# Patient Record
Sex: FEMALE | Race: WHITE | Employment: OTHER | URBAN - METROPOLITAN AREA
[De-identification: names, ages, dates, MRNs, and addresses within clinical notes are randomized per-mention and may not be internally consistent; named-entity substitution may affect disease eponyms.]

---

## 2020-10-27 ENCOUNTER — APPOINTMENT (OUTPATIENT)
Dept: GENERAL RADIOLOGY | Facility: HOSPITAL | Age: 85
End: 2020-10-27
Attending: EMERGENCY MEDICINE
Payer: MEDICARE

## 2020-10-27 ENCOUNTER — APPOINTMENT (OUTPATIENT)
Dept: ULTRASOUND IMAGING | Facility: HOSPITAL | Age: 85
End: 2020-10-27
Attending: EMERGENCY MEDICINE
Payer: MEDICARE

## 2020-10-27 ENCOUNTER — HOSPITAL ENCOUNTER (EMERGENCY)
Facility: HOSPITAL | Age: 85
Discharge: HOME | End: 2020-10-27
Attending: EMERGENCY MEDICINE
Payer: MEDICARE

## 2020-10-27 VITALS
BODY MASS INDEX: 32.2 KG/M2 | HEIGHT: 62 IN | RESPIRATION RATE: 18 BRPM | HEART RATE: 70 BPM | OXYGEN SATURATION: 99 % | DIASTOLIC BLOOD PRESSURE: 72 MMHG | TEMPERATURE: 98 F | WEIGHT: 175 LBS | SYSTOLIC BLOOD PRESSURE: 138 MMHG

## 2020-10-27 DIAGNOSIS — R60.9 DEPENDENT EDEMA: Primary | ICD-10-CM

## 2020-10-27 PROCEDURE — 80048 BASIC METABOLIC PNL TOTAL CA: CPT | Performed by: EMERGENCY MEDICINE

## 2020-10-27 PROCEDURE — 83880 ASSAY OF NATRIURETIC PEPTIDE: CPT | Performed by: EMERGENCY MEDICINE

## 2020-10-27 PROCEDURE — 99285 EMERGENCY DEPT VISIT HI MDM: CPT

## 2020-10-27 PROCEDURE — 84484 ASSAY OF TROPONIN QUANT: CPT | Performed by: EMERGENCY MEDICINE

## 2020-10-27 PROCEDURE — 93005 ELECTROCARDIOGRAM TRACING: CPT

## 2020-10-27 PROCEDURE — 71045 X-RAY EXAM CHEST 1 VIEW: CPT | Performed by: EMERGENCY MEDICINE

## 2020-10-27 PROCEDURE — 93970 EXTREMITY STUDY: CPT | Performed by: EMERGENCY MEDICINE

## 2020-10-27 PROCEDURE — 93010 ELECTROCARDIOGRAM REPORT: CPT | Performed by: EMERGENCY MEDICINE

## 2020-10-27 PROCEDURE — 71046 X-RAY EXAM CHEST 2 VIEWS: CPT | Performed by: EMERGENCY MEDICINE

## 2020-10-27 PROCEDURE — 96374 THER/PROPH/DIAG INJ IV PUSH: CPT

## 2020-10-27 PROCEDURE — 85025 COMPLETE CBC W/AUTO DIFF WBC: CPT | Performed by: EMERGENCY MEDICINE

## 2020-10-27 RX ORDER — FUROSEMIDE 10 MG/ML
40 INJECTION INTRAMUSCULAR; INTRAVENOUS ONCE
Status: COMPLETED | OUTPATIENT
Start: 2020-10-27 | End: 2020-10-27

## 2020-10-27 NOTE — ED NOTES
[facility] here with [facility] to transport patient to [facility].

## 2020-10-27 NOTE — ED NOTES
Pt to ED via EMS with c/o increasing bilateral lower extremity swelling. Pt denies chest pain or sob. No respiratory distress noted. 99% on room air. Pt is alert and oriented x4. Pt currently on 20 mg of Lasix daily. Pt denies recent fall or injury.  +2 blaine

## 2020-10-27 NOTE — ED PROVIDER NOTES
Patient Seen in: [facility] AND [facility] Emergency Department    History   Patient presents with:  Swelling Edema    Stated Complaint: edema    HPI    Patient is here without any symptoms.   She states she feels completely fine she denies any pain in her chest < 120         Review of Systems  Constitutional: no fever, normal appetite, normal energy, has otherwise been feeling well  HEENT: No cough, no congestion  Cardiovascular: no chest pain  Respiratory: no shortness of breath  Gastrointestinal: no abdominal p She is currently taking 40 p.o. at the nursing facility. I recommended compression stockings to the patient she is not currently using any.   She states that she has had this for a long time and she has the same legs as her mother her mother had this issue Impression:  Dependent edema  (primary encounter diagnosis)    Disposition:  Discharge    Follow-up:        See your doctor for recheck in the next 3 to 4 days      Medications Prescribed:  Current Discharge Medication List

## 2021-10-14 ENCOUNTER — HOSPITAL ENCOUNTER (INPATIENT)
Facility: HOSPITAL | Age: 86
LOS: 3 days | Discharge: NURSING, REHAB OR LONG-TERM CARE | DRG: 069 | End: 2021-10-17
Admitting: HOSPITALIST
Payer: MEDICARE

## 2021-10-14 ENCOUNTER — APPOINTMENT (OUTPATIENT)
Dept: CV DIAGNOSTICS | Facility: HOSPITAL | Age: 86
DRG: 069 | End: 2021-10-14
Attending: HOSPITALIST
Payer: MEDICARE

## 2021-10-14 ENCOUNTER — APPOINTMENT (OUTPATIENT)
Dept: CT IMAGING | Facility: HOSPITAL | Age: 86
DRG: 069 | End: 2021-10-14
Payer: MEDICARE

## 2021-10-14 ENCOUNTER — APPOINTMENT (OUTPATIENT)
Dept: ULTRASOUND IMAGING | Facility: HOSPITAL | Age: 86
DRG: 069 | End: 2021-10-14
Attending: HOSPITALIST
Payer: MEDICARE

## 2021-10-14 ENCOUNTER — APPOINTMENT (OUTPATIENT)
Dept: GENERAL RADIOLOGY | Facility: HOSPITAL | Age: 86
DRG: 069 | End: 2021-10-14
Attending: EMERGENCY MEDICINE
Payer: MEDICARE

## 2021-10-14 DIAGNOSIS — R55 SYNCOPE AND COLLAPSE: Primary | ICD-10-CM

## 2021-10-14 PROCEDURE — 70450 CT HEAD/BRAIN W/O DYE: CPT

## 2021-10-14 PROCEDURE — 93970 EXTREMITY STUDY: CPT | Performed by: HOSPITALIST

## 2021-10-14 PROCEDURE — 99223 1ST HOSP IP/OBS HIGH 75: CPT | Performed by: OTHER

## 2021-10-14 PROCEDURE — 93306 TTE W/DOPPLER COMPLETE: CPT | Performed by: HOSPITALIST

## 2021-10-14 PROCEDURE — 71045 X-RAY EXAM CHEST 1 VIEW: CPT | Performed by: EMERGENCY MEDICINE

## 2021-10-14 PROCEDURE — 93880 EXTRACRANIAL BILAT STUDY: CPT | Performed by: HOSPITALIST

## 2021-10-14 RX ORDER — LABETALOL HYDROCHLORIDE 5 MG/ML
10 INJECTION, SOLUTION INTRAVENOUS EVERY 10 MIN PRN
Status: DISCONTINUED | OUTPATIENT
Start: 2021-10-14 | End: 2021-10-16

## 2021-10-14 RX ORDER — HEPARIN SODIUM 1000 [USP'U]/ML
80 INJECTION, SOLUTION INTRAVENOUS; SUBCUTANEOUS ONCE
Status: COMPLETED | OUTPATIENT
Start: 2021-10-14 | End: 2021-10-14

## 2021-10-14 RX ORDER — ATORVASTATIN CALCIUM 40 MG/1
40 TABLET, FILM COATED ORAL NIGHTLY
Status: DISCONTINUED | OUTPATIENT
Start: 2021-10-14 | End: 2021-10-17

## 2021-10-14 RX ORDER — HEPARIN SODIUM AND DEXTROSE 10000; 5 [USP'U]/100ML; G/100ML
INJECTION INTRAVENOUS CONTINUOUS
Status: DISCONTINUED | OUTPATIENT
Start: 2021-10-15 | End: 2021-10-15

## 2021-10-14 RX ORDER — TORSEMIDE 100 MG/1
50 TABLET ORAL DAILY
COMMUNITY

## 2021-10-14 RX ORDER — CLOPIDOGREL BISULFATE 75 MG/1
75 TABLET ORAL DAILY
Status: DISCONTINUED | OUTPATIENT
Start: 2021-10-14 | End: 2021-10-15

## 2021-10-14 RX ORDER — ACETAMINOPHEN 325 MG/1
650 TABLET ORAL 2 TIMES DAILY
COMMUNITY

## 2021-10-14 RX ORDER — HYDRALAZINE HYDROCHLORIDE 20 MG/ML
10 INJECTION INTRAMUSCULAR; INTRAVENOUS EVERY 2 HOUR PRN
Status: DISCONTINUED | OUTPATIENT
Start: 2021-10-14 | End: 2021-10-16

## 2021-10-14 RX ORDER — HEPARIN SODIUM AND DEXTROSE 10000; 5 [USP'U]/100ML; G/100ML
18 INJECTION INTRAVENOUS ONCE
Status: COMPLETED | OUTPATIENT
Start: 2021-10-14 | End: 2021-10-14

## 2021-10-14 RX ORDER — ASPIRIN 325 MG
325 TABLET ORAL DAILY
Status: DISCONTINUED | OUTPATIENT
Start: 2021-10-14 | End: 2021-10-14

## 2021-10-14 RX ORDER — METOCLOPRAMIDE HYDROCHLORIDE 5 MG/ML
5 INJECTION INTRAMUSCULAR; INTRAVENOUS EVERY 8 HOURS PRN
Status: DISCONTINUED | OUTPATIENT
Start: 2021-10-14 | End: 2021-10-14

## 2021-10-14 RX ORDER — ASPIRIN 300 MG
300 SUPPOSITORY, RECTAL RECTAL DAILY
Status: DISCONTINUED | OUTPATIENT
Start: 2021-10-14 | End: 2021-10-14

## 2021-10-14 RX ORDER — ACETAMINOPHEN 650 MG/1
650 SUPPOSITORY RECTAL EVERY 4 HOURS PRN
Status: DISCONTINUED | OUTPATIENT
Start: 2021-10-14 | End: 2021-10-17

## 2021-10-14 RX ORDER — ACETAMINOPHEN 325 MG/1
650 TABLET ORAL EVERY 4 HOURS PRN
Status: DISCONTINUED | OUTPATIENT
Start: 2021-10-14 | End: 2021-10-17

## 2021-10-14 RX ORDER — METOCLOPRAMIDE HYDROCHLORIDE 5 MG/ML
5 INJECTION INTRAMUSCULAR; INTRAVENOUS EVERY 8 HOURS PRN
Status: DISCONTINUED | OUTPATIENT
Start: 2021-10-14 | End: 2021-10-17

## 2021-10-14 RX ORDER — ONDANSETRON 2 MG/ML
4 INJECTION INTRAMUSCULAR; INTRAVENOUS EVERY 6 HOURS PRN
Status: DISCONTINUED | OUTPATIENT
Start: 2021-10-14 | End: 2021-10-14

## 2021-10-14 RX ORDER — HEPARIN SODIUM 5000 [USP'U]/ML
5000 INJECTION, SOLUTION INTRAVENOUS; SUBCUTANEOUS EVERY 8 HOURS SCHEDULED
Status: DISCONTINUED | OUTPATIENT
Start: 2021-10-14 | End: 2021-10-14

## 2021-10-14 RX ORDER — ONDANSETRON 2 MG/ML
4 INJECTION INTRAMUSCULAR; INTRAVENOUS EVERY 6 HOURS PRN
Status: DISCONTINUED | OUTPATIENT
Start: 2021-10-14 | End: 2021-10-17

## 2021-10-14 NOTE — H&P
[facility] Hospitalist H&P     CC: Patient presents with:  Syncope  Altered Mental Status     PCP: Kandice Myers MD    Date of Admission: 10/14/2021  8:47 AM    ASSESSMENT / PLAN:     Ms. Divya Coburn is a 81 yo F with PMH of HTN who presented after a syncopal sided weakness at facility that resolved on arrival to ER. In the ED, CT brain with R occipital lobe infarct, age indeterminate. Also noted to have hypoxia, 85% on RA. Denies shortness of breath or chest pain. No history of CHF, COPD, asthma.  Niece Mar family history. Review of Systems  Comprehensive ROS reviewed and negative except for what's stated above.      OBJECTIVE:  /54 (BP Location: Right arm)   Pulse 55   Temp 97.4 °F (36.3 °C) (Oral)   Resp 15   Wt 175 lb 0.7 oz (79.4 kg)   SpO2 99% cardiomegaly and chronic central pulmonary venous congestion that could indicate mild CHF/fluid overload. 2. Otherwise no acute pulmonary process. 3. Moderate hiatal hernia is unchanged.     Dictated by (CST): Delbert Dejesus MD on 10/14/2021 at 10:03 AM

## 2021-10-14 NOTE — CONSULTS
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

## 2021-10-14 NOTE — ED QUICK NOTES
Orders for admission, patient is aware of plan and ready to go upstairs. Any questions, please call ED RN Sara Isaac  at extension [phone].     Type of COVID test sent: rapid    COVID Suspicion level: Low         Titratable drug(s) infusing: n/a    Rate:

## 2021-10-14 NOTE — ED PROVIDER NOTES
Patient Seen in: [facility] 3w/sw      History   Patient presents with:  Syncope  Altered Mental Status    Stated Complaint: STROKE ALERT    Subjective:   HPI    24-year-old female presents after syncopal episode.   Patient reports this morning whi Rhythm: Normal rate and regular rhythm. Heart sounds: Normal heart sounds. Pulmonary:      Effort: Pulmonary effort is normal. No respiratory distress. Breath sounds: Normal breath sounds.    Abdominal:      General: Bowel sounds are normal. The (*)     All other components within normal limits   LIPID PANEL - Abnormal; Notable for the following components:    HDL Cholesterol 37 (*)     All other components within normal limits   POCT GLUCOSE - Abnormal; Notable for the following components:    PO 10/14/2021  CONCLUSION:  1. Stable cardiomegaly and chronic central pulmonary venous congestion that could indicate mild CHF/fluid overload. 2. Otherwise no acute pulmonary process. 3. Moderate hiatal hernia is unchanged.     Dictated by (CST): April Rodriges

## 2021-10-14 NOTE — ED INITIAL ASSESSMENT (HPI)
PATIENT ARRIVED VIA EMS FROM [facility] AS STROKE ALERT. LAST KNOWN WELL 0805 TODAY. PATIENT REPORTEDLY HAD WITNESSED SYNCOPAL EPISODE AT [facility] 115 BY LEFT SIDED WEAKNESS PER EMS. PATIENT A&OX3 UPON ARRIVAL.

## 2021-10-15 ENCOUNTER — APPOINTMENT (OUTPATIENT)
Dept: MRI IMAGING | Facility: HOSPITAL | Age: 86
DRG: 069 | End: 2021-10-15
Attending: Other
Payer: MEDICARE

## 2021-10-15 ENCOUNTER — APPOINTMENT (OUTPATIENT)
Dept: MRI IMAGING | Facility: HOSPITAL | Age: 86
DRG: 069 | End: 2021-10-15
Attending: HOSPITALIST
Payer: MEDICARE

## 2021-10-15 DIAGNOSIS — G45.9 TIA (TRANSIENT ISCHEMIC ATTACK): Primary | ICD-10-CM

## 2021-10-15 PROCEDURE — 70551 MRI BRAIN STEM W/O DYE: CPT | Performed by: HOSPITALIST

## 2021-10-15 PROCEDURE — 99233 SBSQ HOSP IP/OBS HIGH 50: CPT | Performed by: OTHER

## 2021-10-15 PROCEDURE — 70544 MR ANGIOGRAPHY HEAD W/O DYE: CPT | Performed by: OTHER

## 2021-10-15 RX ORDER — PANTOPRAZOLE SODIUM 20 MG/1
20 TABLET, DELAYED RELEASE ORAL DAILY
Status: DISCONTINUED | OUTPATIENT
Start: 2021-10-15 | End: 2021-10-17

## 2021-10-15 RX ORDER — LEVOTHYROXINE SODIUM 0.07 MG/1
75 TABLET ORAL
Status: DISCONTINUED | OUTPATIENT
Start: 2021-10-15 | End: 2021-10-17

## 2021-10-15 RX ORDER — FUROSEMIDE 10 MG/ML
20 INJECTION INTRAMUSCULAR; INTRAVENOUS
Status: DISCONTINUED | OUTPATIENT
Start: 2021-10-15 | End: 2021-10-16

## 2021-10-15 RX ORDER — DONEPEZIL HYDROCHLORIDE 10 MG/1
10 TABLET, FILM COATED ORAL NIGHTLY
Status: DISCONTINUED | OUTPATIENT
Start: 2021-10-15 | End: 2021-10-17

## 2021-10-15 NOTE — PHYSICAL THERAPY NOTE
PT orders for eval and treat received. Per medical chart pt with new DVT, started on heparin drip and PTT >240. Will defer PT eval for today and check on pt tomorrow as able/appropriate.     Brandie Ruano, [facility]

## 2021-10-15 NOTE — OCCUPATIONAL THERAPY NOTE
OT orders received and chart reviewed. Per chart pt with new DVT. Heparin initiated;elevated PTT.  OT eval deferred

## 2021-10-15 NOTE — PLAN OF CARE
Pt alert and oriented x3. Disoriented to place. Q2h neuro and vitals check performed until 0400. Pt started on heparin drip due to nonocclusive DVT on L femoral vein. Ceftriaxone administered for UTI.    Plan for MRI of the brain    Problem: Patient Becca saturation or ABGs  - Provide Smoking Cessation handout, if applicable  - Encourage broncho-pulmonary hygiene including cough, deep breathe, Incentive Spirometry  - Assess the need for suctioning and perform as needed  - Assess and instruct to report SOB o Progressing  10/15/2021 0225 by Lele Edwards RN  Outcome: Progressing     - Provide timely, complete, and accurate information to patient/family  - Incorporate patient and family knowledge, values, beliefs, and cultural backgrounds into the plann

## 2021-10-15 NOTE — PROGRESS NOTES
[facility] Hospitalist Progress Note     CC: Hospital Follow up    PCP: Susy Sams MD       Assessment/Plan:     Principal Problem:    Syncope and collapse  Active Problems:    TIA (transient ischemic attack)    Focal infarction of brain admission     FN:  - IVF: none  - Diet: regular     DVT Prophy: SCD, eliquis  Lines: PIV     Dispo: Plan for discharge back to rehab on 10/16 if clinically stable    Code status: DNR    Questions/concerns were discussed with patient and/or family by bedsid TPROT      Imaging:  CT BRAIN OR HEAD ([id number])    Result Date: 10/14/2021  CONCLUSION:  Moderate chronic microvascular ischemic disease. Right occipital lobe infarct is age indeterminate but has a remote appearance.   If there is high clinical suspicion for could indicate mild CHF/fluid overload. 2. Otherwise no acute pulmonary process. 3. Moderate hiatal hernia is unchanged.     Dictated by (CST): Kvng Hutchins MD on 10/14/2021 at 10:03 AM     Finalized by (CST): Kvng Hutchins MD on 10/14/2021 at 10

## 2021-10-15 NOTE — PROGRESS NOTES
[facility]  [address] [facility], [address] [place] [address]  [phone]          INPATIENT NEUROLOGY   FOLLOW UP [facility]     Report of Consultation    Arianne Morales Patient Status:  Inpatient IMAGING:  MRI/A  I PERSONALLY REVIEWED THESE IMAGES AND AGREE WITH THE IMPRESSION.      ASSESSMENT:  The patient is a 80year old woman with past medical history of hypertension, hearing impairment, cognitive impairment who presented after syncopal team: since she will continue to be on anticoagulation for DVT, can use oral anticoagulation for that indicated time period, and resume Plavix afterwards.   No significant intra- or extracranial atherosclerosis to indicate need for both antiplatelet and ant

## 2021-10-15 NOTE — CM/SW NOTE
Patient is from [facility]. Patient will need to be able to pivot transfer in order to return. PT/OT evaluations and recommendations  are needed.      Spoke with patient Eric Like [phone]  If patient  Qualifies for sub acute cy

## 2021-10-15 NOTE — PLAN OF CARE
Pt confused to time. On heparin drip for nonocclusive DVT. Incontinent X2, on room air, [facility]. Plan for MRI today. Pt updated on plan of care.    Problem: Patient Centered Care  Goal: Patient preferences are identified and integrated in the patie limitations  - Instruct pt to call for assistance with activity based on assessment  - Modify environment to reduce risk of injury  - Provide assistive devices as appropriate  - Consider OT/PT consult to assist with strengthening/mobility  - Encourage toil oxygenation  Description: INTERVENTIONS:  - Assess for changes in respiratory status  - Assess for changes in mentation and behavior  - Position to facilitate oxygenation and minimize respiratory effort  - Oxygen supplementation based on oxygen saturation

## 2021-10-16 ENCOUNTER — APPOINTMENT (OUTPATIENT)
Dept: GENERAL RADIOLOGY | Facility: HOSPITAL | Age: 86
DRG: 069 | End: 2021-10-16
Attending: HOSPITALIST
Payer: MEDICARE

## 2021-10-16 PROCEDURE — 73030 X-RAY EXAM OF SHOULDER: CPT | Performed by: HOSPITALIST

## 2021-10-16 RX ORDER — BISACODYL 10 MG
10 SUPPOSITORY, RECTAL RECTAL
Status: DISCONTINUED | OUTPATIENT
Start: 2021-10-16 | End: 2021-10-17

## 2021-10-16 RX ORDER — POLYETHYLENE GLYCOL 3350 17 G/17G
17 POWDER, FOR SOLUTION ORAL DAILY PRN
Status: DISCONTINUED | OUTPATIENT
Start: 2021-10-16 | End: 2021-10-17

## 2021-10-16 RX ORDER — HYDRALAZINE HYDROCHLORIDE 50 MG/1
50 TABLET, FILM COATED ORAL 2 TIMES DAILY
Status: DISCONTINUED | OUTPATIENT
Start: 2021-10-16 | End: 2021-10-16

## 2021-10-16 RX ORDER — TORSEMIDE 20 MG/1
50 TABLET ORAL DAILY
Status: DISCONTINUED | OUTPATIENT
Start: 2021-10-16 | End: 2021-10-16

## 2021-10-16 RX ORDER — METOPROLOL SUCCINATE 50 MG/1
50 TABLET, EXTENDED RELEASE ORAL DAILY
Status: DISCONTINUED | OUTPATIENT
Start: 2021-10-16 | End: 2021-10-17

## 2021-10-16 RX ORDER — TORSEMIDE 20 MG/1
50 TABLET ORAL DAILY
Status: DISCONTINUED | OUTPATIENT
Start: 2021-10-17 | End: 2021-10-17

## 2021-10-16 RX ORDER — HYDRALAZINE HYDROCHLORIDE 50 MG/1
50 TABLET, FILM COATED ORAL 3 TIMES DAILY
Status: DISCONTINUED | OUTPATIENT
Start: 2021-10-16 | End: 2021-10-17

## 2021-10-16 RX ORDER — ATORVASTATIN CALCIUM 40 MG/1
40 TABLET, FILM COATED ORAL NIGHTLY
Qty: 30 TABLET | Refills: 0 | Status: SHIPPED | OUTPATIENT
Start: 2021-10-16 | End: 2022-01-11

## 2021-10-16 NOTE — CM/SW NOTE
EDISON followed up on DC planning. Per chart review, Pt from [facility] and would need ELTON. [address] spoke with jovan who is agreeable for pt to DC to [facility] if therapy recs ELTON.     EDISON sent therapy notes in aidin pending confirmation OB has a bed for the

## 2021-10-16 NOTE — PROGRESS NOTES
[facility] Hospitalist Progress Note     CC: Hospital Follow up    PCP: Jseus Triana MD       Assessment/Plan:     Principal Problem:    Syncope and collapse  Active Problems:    TIA (transient ischemic attack)    Focal infarction of brain Eliquis      HTN  - resume as able  - on metop, hydral, torsemide, resumed today     GOC  - CODE- DNR/comfort- confirmed with Ladell Primus- nephew, updated on admission     FN:  - IVF: none  - Diet: regular     DVT Prophy: SCD, eliquis  Lines: 7. 7   .0 193.0 183.0         Recent Labs   Lab 10/14/21  0850 10/15/21  0538   * 91   BUN 35* 35*   CREATSERUM 1.53* 1.33*   GFRAA 34* 40*   GFRNAA 30* 35*   CA 8.8 8.8    141   K 4.0 3.9    105   CO2 25.0 29.0       No results fo Michelle Friedman MD on 10/15/2021 at 9:37 AM          [address] - DIAG IMG (CPT=93880)    Result Date: 10/14/2021  CONCLUSION:  1. 0-49% stenosis bilateral ICA. 2. 3.8 cm solid nodule in the right lobe of the thyroid.     Dictated by (CST): Lamont

## 2021-10-16 NOTE — PLAN OF CARE
Pt oriented x3. Disoriented to place. Denies chest pain/SOB . Q4h neuro and vital checks performed. No acute changes noted. Pt still receiving IV antibiotics for UTI. Plan for [facility] vs. [facility] pending PT/OT evaluation.      Problem: Patient Centered Achieves optimal ventilation and oxygenation  Description: INTERVENTIONS:  - Assess for changes in respiratory status  - Assess for changes in mentation and behavior  - Position to facilitate oxygenation and minimize respiratory effort  - Oxygen supplement ordered  - Provide education handouts and proof of immunizations to parent/legal guardian  - Facilitate outpatient follow-up appointments  - Consult Case Management and Social Work for medical and insurance needs  Outcome: Progressing

## 2021-10-16 NOTE — PHYSICAL THERAPY NOTE
PHYSICAL THERAPY EVALUATION - INPATIENT     Room Number: 345/345-A  Evaluation Date: 10/16/2021  Type of Evaluation: Initial   Physician Order: PT Eval and Treat    Presenting Problem: TIA, syncope, resp failure, LLE DVT  Reason for Therapy: Mobility Dysf pain.    Problem List  Principal Problem:    Syncope and collapse  Active Problems:    TIA (transient ischemic attack)    Focal infarction of brain [facility])    Toxic encephalopathy      Past Medical History  Past Medical History:   Diagnosis Date   • Disorder Turning over in bed (including adjusting bedclothes, sheets and blankets)?: A Lot   -   Sitting down on and standing up from a chair with arms (e.g., wheelchair, bedside commode, etc.): A Lot   -   Moving from lying on back to sitting on the side of the be patient in preparation for discharge.    Goal #5   Current Status    Goal #6    Goal #6  Current Status

## 2021-10-17 VITALS
WEIGHT: 173 LBS | OXYGEN SATURATION: 94 % | SYSTOLIC BLOOD PRESSURE: 143 MMHG | RESPIRATION RATE: 18 BRPM | HEART RATE: 74 BPM | TEMPERATURE: 99 F | BODY MASS INDEX: 34 KG/M2 | DIASTOLIC BLOOD PRESSURE: 55 MMHG

## 2021-10-17 RX ORDER — HYDRALAZINE HYDROCHLORIDE 50 MG/1
50 TABLET, FILM COATED ORAL 3 TIMES DAILY
Qty: 90 TABLET | Refills: 0 | Status: SHIPPED | COMMUNITY
Start: 2021-10-17

## 2021-10-17 NOTE — OCCUPATIONAL THERAPY NOTE
OCCUPATIONAL THERAPY EVALUATION - INPATIENT     Room Number: 345/345-A  Evaluation Date: 10/17/2021  Type of Evaluation: Initial  Presenting Problem:  (syncope and collapse)    Physician Order: IP Consult to Occupational Therapy  Reason for Therapy: ADL/IA HOB elevates and tray set up. Slight pressure relief provided with pillow under RT side. Call light in reach with pt demo appropriate use.      Pt educated on role of OT, goal of session as well as recommendations to increase active movement of TIGRE UE/LEs i in a w/c. (no one present to confirm accuracy)    Prior Level of [place]: Per pt report, she manages BADLs and mobility within her aparment mod I.     SUBJECTIVE  \"I don't want to move, I just want to be comfortable right now. .i know they'll make me session/findings; Alarm set (HOB elevated)    OT Goals  Patients self stated goal is: to be comfortable     Patient will complete bed mobility with mod A for participation in pressure relief and supine ADLs 1x  Comment:      Comment:     Patient will tolera

## 2021-10-17 NOTE — CM/SW NOTE
1059:  EDISON received message from RN/Jen inquiring if bed is available for patient to admit to [facility].      SW placed call to [facility], spoke to yvette/Bryan, who said clinical information will be reviewed and he will call back ASAP if bed is available to

## 2021-10-17 NOTE — PLAN OF CARE
Patient being discharged to [facility]. Report given to [facility], all questions answered.

## 2021-10-17 NOTE — DISCHARGE SUMMARY
General Medicine Discharge Summary     Patient ID:  Murray Beard  80year old  5/19/1930    Admit date: 10/14/2021    Discharge date and time: 10/17/2021    Attending Physician: Yesi Ventura MD     Consults: IP CONSULT TO HOSPITALIST  IP CONSULT TO SOCIAL transition to Plavix after 3 months of therapy, patient functionally weaker, recommended for ELTON, per PT.   Plans for [facility] to [facility], follow-up with PCP in 1 to 2 weeks, follow-up with neurology in 1 month.     Acute hypoxic respiratory failur COMPLETE (MIN 2 VIEWS), RIGHT (CPT=73030)    Result Date: 10/16/2021  CONCLUSION:  1. No acute appearing fracture or dislocation.   Moderate degenerative narrowing of the glenohumeral joint with mild spurring of the inferior articular surface of the humeral Changes:     Med list     Medication List      START taking these medications    apixaban 5 MG Tabs  Commonly known as: ELIQUIS  Take 1 tablet (5 mg total) by mouth 2 (two) times daily.      atorvastatin 40 MG Tabs  Commonly known as: LIPITOR  Take 1 tablet in 1 week. Specialty: Geriatrics  Contact information:  [address] [place] [address] [phone]                         DC instructions: Other Discharge Instructions:        You have been started on Eliquis for treatment of a blood Medical treatment may be available if action is taken early enough. I reconciled current and discharge medications on day of discharge, discussed changes with patient and noted changes above.        Total Time Coordinating Care: Greater than 30 m

## 2021-10-17 NOTE — PLAN OF CARE
Continues to have intermittent right shoulder pain and edema. Continue q4h neuro checks. Purewick in place. IV rocephin for UTI. Plan for discharge to subacute rehab.      Problem: Patient Centered Care  Goal: Patient preferences are identified and integrat screening  - Complete education with parent/legal guardian  - Teach family how to prepare feedings  - Teach family how to administer medications  - Obtain prescriptions and verify correct dosage of home medications  - Build confidence of parent/family by e Initiate measures to prevent increased intracranial pressure  - Maintain blood pressure and fluid volume within ordered parameters to optimize cerebral perfusion and minimize risk of hemorrhage  - Monitor temperature, glucose, and sodium.  Initiate appropri

## 2022-01-01 ENCOUNTER — HOSPITAL ENCOUNTER (INPATIENT)
Facility: HOSPITAL | Age: 87
LOS: 6 days | Discharge: HOSPICE | DRG: 375 | End: 2022-01-01
Attending: EMERGENCY MEDICINE | Admitting: HOSPITALIST
Payer: MEDICARE

## 2022-01-01 ENCOUNTER — HOSPITAL ENCOUNTER (INPATIENT)
Facility: HOSPITAL | Age: 87
LOS: 1 days | DRG: 375 | End: 2022-01-01
Attending: STUDENT IN AN ORGANIZED HEALTH CARE EDUCATION/TRAINING PROGRAM | Admitting: STUDENT IN AN ORGANIZED HEALTH CARE EDUCATION/TRAINING PROGRAM
Payer: OTHER MISCELLANEOUS

## 2022-01-01 ENCOUNTER — APPOINTMENT (OUTPATIENT)
Dept: GENERAL RADIOLOGY | Facility: HOSPITAL | Age: 87
DRG: 375 | End: 2022-01-01
Attending: INTERNAL MEDICINE
Payer: MEDICARE

## 2022-01-01 ENCOUNTER — APPOINTMENT (OUTPATIENT)
Dept: GENERAL RADIOLOGY | Facility: HOSPITAL | Age: 87
DRG: 375 | End: 2022-01-01
Attending: SURGERY
Payer: MEDICARE

## 2022-01-01 ENCOUNTER — APPOINTMENT (OUTPATIENT)
Dept: CT IMAGING | Facility: HOSPITAL | Age: 87
DRG: 375 | End: 2022-01-01
Attending: INTERNAL MEDICINE
Payer: MEDICARE

## 2022-01-01 ENCOUNTER — APPOINTMENT (OUTPATIENT)
Dept: ULTRASOUND IMAGING | Facility: HOSPITAL | Age: 87
DRG: 375 | End: 2022-01-01
Attending: INTERNAL MEDICINE
Payer: MEDICARE

## 2022-01-01 VITALS
DIASTOLIC BLOOD PRESSURE: 30 MMHG | HEART RATE: 134 BPM | TEMPERATURE: 99 F | RESPIRATION RATE: 20 BRPM | OXYGEN SATURATION: 82 % | SYSTOLIC BLOOD PRESSURE: 61 MMHG

## 2022-01-01 VITALS
WEIGHT: 170 LBS | SYSTOLIC BLOOD PRESSURE: 113 MMHG | DIASTOLIC BLOOD PRESSURE: 44 MMHG | RESPIRATION RATE: 18 BRPM | HEIGHT: 64 IN | OXYGEN SATURATION: 94 % | BODY MASS INDEX: 29.02 KG/M2 | TEMPERATURE: 98 F | HEART RATE: 106 BPM

## 2022-01-01 DIAGNOSIS — E87.2 METABOLIC ACIDOSIS: ICD-10-CM

## 2022-01-01 DIAGNOSIS — E83.51 HYPOCALCEMIA: Primary | ICD-10-CM

## 2022-01-01 DIAGNOSIS — E86.0 DEHYDRATION: ICD-10-CM

## 2022-01-01 DIAGNOSIS — E87.6 HYPOKALEMIA: ICD-10-CM

## 2022-01-01 LAB
ADENOVIRUS F 40/41 PCR: NEGATIVE
ALBUMIN SERPL-MCNC: 2 G/DL (ref 3.4–5)
ALBUMIN SERPL-MCNC: 2.4 G/DL (ref 3.4–5)
ALBUMIN/GLOB SERPL: 0.6 {RATIO} (ref 1–2)
ALP LIVER SERPL-CCNC: 102 U/L
ALP LIVER SERPL-CCNC: 118 U/L
ALT SERPL-CCNC: 24 U/L
ALT SERPL-CCNC: 26 U/L
ANION GAP SERPL CALC-SCNC: 5 MMOL/L (ref 0–18)
ANION GAP SERPL CALC-SCNC: 6 MMOL/L (ref 0–18)
ANION GAP SERPL CALC-SCNC: 7 MMOL/L (ref 0–18)
ANION GAP SERPL CALC-SCNC: 8 MMOL/L (ref 0–18)
ANION GAP SERPL CALC-SCNC: 9 MMOL/L (ref 0–18)
AST SERPL-CCNC: 24 U/L (ref 15–37)
AST SERPL-CCNC: 28 U/L (ref 15–37)
ASTROVIRUS PCR: NEGATIVE
BASOPHILS # BLD AUTO: 0.02 X10(3) UL (ref 0–0.2)
BASOPHILS NFR BLD AUTO: 0.2 %
BASOPHILS NFR BLD AUTO: 0.2 %
BILIRUB DIRECT SERPL-MCNC: 0.2 MG/DL (ref 0–0.2)
BILIRUB SERPL-MCNC: 0.4 MG/DL (ref 0.1–2)
BILIRUB SERPL-MCNC: 0.5 MG/DL (ref 0.1–2)
BILIRUB UR QL: NEGATIVE
BUN BLD-MCNC: 50 MG/DL (ref 7–18)
BUN BLD-MCNC: 51 MG/DL (ref 7–18)
BUN BLD-MCNC: 55 MG/DL (ref 7–18)
BUN BLD-MCNC: 55 MG/DL (ref 7–18)
BUN BLD-MCNC: 61 MG/DL (ref 7–18)
BUN BLD-MCNC: 63 MG/DL (ref 7–18)
BUN BLD-MCNC: 66 MG/DL (ref 7–18)
BUN BLD-MCNC: 66 MG/DL (ref 7–18)
BUN BLD-MCNC: 70 MG/DL (ref 7–18)
BUN BLD-MCNC: 71 MG/DL (ref 7–18)
BUN/CREAT SERPL: 31.4 (ref 10–20)
BUN/CREAT SERPL: 34.3 (ref 10–20)
BUN/CREAT SERPL: 35.7 (ref 10–20)
BUN/CREAT SERPL: 38.6 (ref 10–20)
BUN/CREAT SERPL: 38.8 (ref 10–20)
BUN/CREAT SERPL: 38.8 (ref 10–20)
BUN/CREAT SERPL: 39.9 (ref 10–20)
BUN/CREAT SERPL: 39.9 (ref 10–20)
BUN/CREAT SERPL: 40.4 (ref 10–20)
BUN/CREAT SERPL: 41.7 (ref 10–20)
C CAYETANENSIS DNA SPEC QL NAA+PROBE: NEGATIVE
C DIFF TOX B STL QL: NEGATIVE
CALCIUM BLD-MCNC: 7.3 MG/DL (ref 8.5–10.1)
CALCIUM BLD-MCNC: 7.6 MG/DL (ref 8.5–10.1)
CALCIUM BLD-MCNC: 7.6 MG/DL (ref 8.5–10.1)
CALCIUM BLD-MCNC: 7.7 MG/DL (ref 8.5–10.1)
CALCIUM BLD-MCNC: 7.8 MG/DL (ref 8.5–10.1)
CALCIUM BLD-MCNC: 7.9 MG/DL (ref 8.5–10.1)
CALCIUM BLD-MCNC: 7.9 MG/DL (ref 8.5–10.1)
CALCIUM BLD-MCNC: 8 MG/DL (ref 8.5–10.1)
CAMPY SP DNA.DIARRHEA STL QL NAA+PROBE: NEGATIVE
CHLORIDE SERPL-SCNC: 107 MMOL/L (ref 98–112)
CHLORIDE SERPL-SCNC: 108 MMOL/L (ref 98–112)
CHLORIDE SERPL-SCNC: 109 MMOL/L (ref 98–112)
CHLORIDE SERPL-SCNC: 111 MMOL/L (ref 98–112)
CHLORIDE SERPL-SCNC: 123 MMOL/L (ref 98–112)
CHLORIDE UR-SCNC: 11 MMOL/L
CO2 SERPL-SCNC: 14 MMOL/L (ref 21–32)
CO2 SERPL-SCNC: 22 MMOL/L (ref 21–32)
CO2 SERPL-SCNC: 23 MMOL/L (ref 21–32)
CO2 SERPL-SCNC: 24 MMOL/L (ref 21–32)
CO2 SERPL-SCNC: 24 MMOL/L (ref 21–32)
CO2 SERPL-SCNC: 25 MMOL/L (ref 21–32)
COLOR UR: YELLOW
CREAT BLD-MCNC: 1.32 MG/DL
CREAT BLD-MCNC: 1.32 MG/DL
CREAT BLD-MCNC: 1.38 MG/DL
CREAT BLD-MCNC: 1.4 MG/DL
CREAT BLD-MCNC: 1.53 MG/DL
CREAT BLD-MCNC: 1.56 MG/DL
CREAT BLD-MCNC: 1.7 MG/DL
CREAT BLD-MCNC: 1.7 MG/DL
CREAT BLD-MCNC: 2.04 MG/DL
CREAT BLD-MCNC: 2.26 MG/DL
CREAT UR-SCNC: 83 MG/DL
CRYPTOSP DNA SPEC QL NAA+PROBE: NEGATIVE
DEPRECATED RDW RBC AUTO: 45.3 FL (ref 35.1–46.3)
DEPRECATED RDW RBC AUTO: 46.1 FL (ref 35.1–46.3)
DEPRECATED RDW RBC AUTO: 46.5 FL (ref 35.1–46.3)
DEPRECATED RDW RBC AUTO: 46.7 FL (ref 35.1–46.3)
DEPRECATED RDW RBC AUTO: 47.1 FL (ref 35.1–46.3)
DEPRECATED RDW RBC AUTO: 47.9 FL (ref 35.1–46.3)
DEPRECATED RDW RBC AUTO: 48.7 FL (ref 35.1–46.3)
DEPRECATED RDW RBC AUTO: 49.9 FL (ref 35.1–46.3)
EAEC PAA PLAS AGGR+AATA ST NAA+NON-PRB: NEGATIVE
EC STX1+STX2 + H7 FLIC SPEC NAA+PROBE: NEGATIVE
ENTAMOEBA HISTOLYTICA PCR: NEGATIVE
EOSINOPHIL # BLD AUTO: 0 X10(3) UL (ref 0–0.7)
EOSINOPHIL # BLD AUTO: 0 X10(3) UL (ref 0–0.7)
EOSINOPHIL NFR BLD AUTO: 0 %
EOSINOPHIL NFR BLD AUTO: 0 %
EPEC EAE GENE STL QL NAA+NON-PROBE: NEGATIVE
ERYTHROCYTE [DISTWIDTH] IN BLOOD BY AUTOMATED COUNT: 14.2 % (ref 11–15)
ERYTHROCYTE [DISTWIDTH] IN BLOOD BY AUTOMATED COUNT: 14.2 % (ref 11–15)
ERYTHROCYTE [DISTWIDTH] IN BLOOD BY AUTOMATED COUNT: 14.3 % (ref 11–15)
ERYTHROCYTE [DISTWIDTH] IN BLOOD BY AUTOMATED COUNT: 14.3 % (ref 11–15)
ERYTHROCYTE [DISTWIDTH] IN BLOOD BY AUTOMATED COUNT: 14.4 % (ref 11–15)
ERYTHROCYTE [DISTWIDTH] IN BLOOD BY AUTOMATED COUNT: 14.4 % (ref 11–15)
ERYTHROCYTE [DISTWIDTH] IN BLOOD BY AUTOMATED COUNT: 14.6 % (ref 11–15)
ETEC LTA+ST1A+ST1B TOX ST NAA+NON-PROBE: NEGATIVE
GIARDIA LAMBLIA PCR: NEGATIVE
GLOBULIN PLAS-MCNC: 3.1 G/DL (ref 2.8–4.4)
GLUCOSE BLD-MCNC: 100 MG/DL (ref 70–99)
GLUCOSE BLD-MCNC: 101 MG/DL (ref 70–99)
GLUCOSE BLD-MCNC: 120 MG/DL (ref 70–99)
GLUCOSE BLD-MCNC: 70 MG/DL (ref 70–99)
GLUCOSE BLD-MCNC: 85 MG/DL (ref 70–99)
GLUCOSE BLD-MCNC: 86 MG/DL (ref 70–99)
GLUCOSE BLD-MCNC: 86 MG/DL (ref 70–99)
GLUCOSE BLD-MCNC: 95 MG/DL (ref 70–99)
GLUCOSE BLD-MCNC: 95 MG/DL (ref 70–99)
GLUCOSE UR-MCNC: NEGATIVE MG/DL
HCT VFR BLD AUTO: 25 %
HCT VFR BLD AUTO: 26.1 %
HCT VFR BLD AUTO: 26.2 %
HCT VFR BLD AUTO: 27.1 %
HCT VFR BLD AUTO: 28.2 %
HCT VFR BLD AUTO: 28.9 %
HCT VFR BLD AUTO: 29.9 %
HCT VFR BLD AUTO: 33.1 %
HGB BLD-MCNC: 10.2 G/DL
HGB BLD-MCNC: 7.8 G/DL
HGB BLD-MCNC: 8.2 G/DL
HGB BLD-MCNC: 8.5 G/DL
HGB BLD-MCNC: 8.6 G/DL
HGB BLD-MCNC: 8.7 G/DL
HGB BLD-MCNC: 9.1 G/DL
HGB BLD-MCNC: 9.2 G/DL
HYALINE CASTS #/AREA URNS AUTO: PRESENT /LPF
IMM GRANULOCYTES # BLD AUTO: 0.03 X10(3) UL (ref 0–1)
IMM GRANULOCYTES # BLD AUTO: 0.04 X10(3) UL (ref 0–1)
IMM GRANULOCYTES NFR BLD: 0.3 %
IMM GRANULOCYTES NFR BLD: 0.4 %
LYMPHOCYTES # BLD AUTO: 0.55 X10(3) UL (ref 1–4)
LYMPHOCYTES # BLD AUTO: 0.67 X10(3) UL (ref 1–4)
LYMPHOCYTES NFR BLD AUTO: 5.9 %
LYMPHOCYTES NFR BLD AUTO: 6.5 %
MAGNESIUM SERPL-MCNC: 1.9 MG/DL (ref 1.6–2.6)
MAGNESIUM SERPL-MCNC: 2 MG/DL (ref 1.6–2.6)
MCH RBC QN AUTO: 28 PG (ref 26–34)
MCH RBC QN AUTO: 28.1 PG (ref 26–34)
MCH RBC QN AUTO: 28.3 PG (ref 26–34)
MCH RBC QN AUTO: 28.5 PG (ref 26–34)
MCH RBC QN AUTO: 28.8 PG (ref 26–34)
MCHC RBC AUTO-ENTMCNC: 30.4 G/DL (ref 31–37)
MCHC RBC AUTO-ENTMCNC: 30.9 G/DL (ref 31–37)
MCHC RBC AUTO-ENTMCNC: 31.2 G/DL (ref 31–37)
MCHC RBC AUTO-ENTMCNC: 31.4 G/DL (ref 31–37)
MCHC RBC AUTO-ENTMCNC: 31.7 G/DL (ref 31–37)
MCHC RBC AUTO-ENTMCNC: 32.4 G/DL (ref 31–37)
MCV RBC AUTO: 87.9 FL
MCV RBC AUTO: 88.4 FL
MCV RBC AUTO: 89.1 FL
MCV RBC AUTO: 89.7 FL
MCV RBC AUTO: 90.6 FL
MCV RBC AUTO: 92.5 FL
MCV RBC AUTO: 92.5 FL
MCV RBC AUTO: 94.6 FL
MONOCYTES # BLD AUTO: 1.17 X10(3) UL (ref 0.1–1)
MONOCYTES # BLD AUTO: 1.37 X10(3) UL (ref 0.1–1)
MONOCYTES NFR BLD AUTO: 11.4 %
MONOCYTES NFR BLD AUTO: 14.8 %
NEUTROPHILS # BLD AUTO: 7.28 X10 (3) UL (ref 1.5–7.7)
NEUTROPHILS # BLD AUTO: 7.28 X10(3) UL (ref 1.5–7.7)
NEUTROPHILS # BLD AUTO: 8.33 X10 (3) UL (ref 1.5–7.7)
NEUTROPHILS # BLD AUTO: 8.33 X10(3) UL (ref 1.5–7.7)
NEUTROPHILS NFR BLD AUTO: 78.8 %
NEUTROPHILS NFR BLD AUTO: 81.5 %
NITRITE UR QL STRIP.AUTO: NEGATIVE
NOROVIRUS GI/GII PCR: NEGATIVE
OSMOLALITY SERPL CALC.SUM OF ELEC: 299 MOSM/KG (ref 275–295)
OSMOLALITY SERPL CALC.SUM OF ELEC: 300 MOSM/KG (ref 275–295)
OSMOLALITY SERPL CALC.SUM OF ELEC: 301 MOSM/KG (ref 275–295)
OSMOLALITY SERPL CALC.SUM OF ELEC: 307 MOSM/KG (ref 275–295)
OSMOLALITY SERPL CALC.SUM OF ELEC: 308 MOSM/KG (ref 275–295)
OSMOLALITY SERPL CALC.SUM OF ELEC: 309 MOSM/KG (ref 275–295)
OSMOLALITY SERPL CALC.SUM OF ELEC: 311 MOSM/KG (ref 275–295)
OSMOLALITY SERPL CALC.SUM OF ELEC: 315 MOSM/KG (ref 275–295)
P SHIGELLOIDES DNA STL QL NAA+PROBE: NEGATIVE
PH UR: 5 [PH] (ref 5–8)
PLATELET # BLD AUTO: 270 10(3)UL (ref 150–450)
PLATELET # BLD AUTO: 284 10(3)UL (ref 150–450)
PLATELET # BLD AUTO: 298 10(3)UL (ref 150–450)
PLATELET # BLD AUTO: 317 10(3)UL (ref 150–450)
PLATELET # BLD AUTO: 350 10(3)UL (ref 150–450)
PLATELET # BLD AUTO: 351 10(3)UL (ref 150–450)
PLATELET # BLD AUTO: 354 10(3)UL (ref 150–450)
PLATELET # BLD AUTO: 364 10(3)UL (ref 150–450)
POTASSIUM SERPL-SCNC: 2.8 MMOL/L (ref 3.5–5.1)
POTASSIUM SERPL-SCNC: 3.4 MMOL/L (ref 3.5–5.1)
POTASSIUM SERPL-SCNC: 3.6 MMOL/L (ref 3.5–5.1)
POTASSIUM SERPL-SCNC: 3.7 MMOL/L (ref 3.5–5.1)
POTASSIUM SERPL-SCNC: 3.7 MMOL/L (ref 3.5–5.1)
POTASSIUM SERPL-SCNC: 3.9 MMOL/L (ref 3.5–5.1)
POTASSIUM SERPL-SCNC: 3.9 MMOL/L (ref 3.5–5.1)
POTASSIUM SERPL-SCNC: 4 MMOL/L (ref 3.5–5.1)
POTASSIUM SERPL-SCNC: 4 MMOL/L (ref 3.5–5.1)
POTASSIUM SERPL-SCNC: 4.1 MMOL/L (ref 3.5–5.1)
POTASSIUM SERPL-SCNC: 4.1 MMOL/L (ref 3.5–5.1)
POTASSIUM SERPL-SCNC: 4.5 MMOL/L (ref 3.5–5.1)
POTASSIUM UR-SCNC: 71.3 MMOL/L
PROT SERPL-MCNC: 5.1 G/DL (ref 6.4–8.2)
PROT SERPL-MCNC: 6 G/DL (ref 6.4–8.2)
PROT UR-MCNC: NEGATIVE MG/DL
RBC # BLD AUTO: 2.76 X10(6)UL
RBC # BLD AUTO: 2.93 X10(6)UL
RBC # BLD AUTO: 2.98 X10(6)UL
RBC # BLD AUTO: 3.02 X10(6)UL
RBC # BLD AUTO: 3.05 X10(6)UL
RBC # BLD AUTO: 3.16 X10(6)UL
RBC # BLD AUTO: 3.27 X10(6)UL
RBC # BLD AUTO: 3.58 X10(6)UL
ROTAVIRUS A PCR: NEGATIVE
SALMONELLA DNA SPEC QL NAA+PROBE: NEGATIVE
SAPOVIRUS PCR: NEGATIVE
SARS-COV-2 RNA RESP QL NAA+PROBE: NOT DETECTED
SHIGELLA SP+EIEC IPAH ST NAA+NON-PROBE: NEGATIVE
SODIUM SERPL-SCNC: 136 MMOL/L (ref 136–145)
SODIUM SERPL-SCNC: 138 MMOL/L (ref 136–145)
SODIUM SERPL-SCNC: 139 MMOL/L (ref 136–145)
SODIUM SERPL-SCNC: 142 MMOL/L (ref 136–145)
SODIUM SERPL-SCNC: 142 MMOL/L (ref 136–145)
SODIUM SERPL-SCNC: 146 MMOL/L (ref 136–145)
SODIUM SERPL-SCNC: 5 MMOL/L
SP GR UR STRIP: 1.02 (ref 1–1.03)
UROBILINOGEN UR STRIP-ACNC: <2
UUN UR-MCNC: 790 MG/DL
V CHOLERAE DNA SPEC QL NAA+PROBE: NEGATIVE
VIBRIO DNA SPEC NAA+PROBE: NEGATIVE
VIT C UR-MCNC: NEGATIVE MG/DL
WBC # BLD AUTO: 10.2 X10(3) UL (ref 4–11)
WBC # BLD AUTO: 11.7 X10(3) UL (ref 4–11)
WBC # BLD AUTO: 12.3 X10(3) UL (ref 4–11)
WBC # BLD AUTO: 14.2 X10(3) UL (ref 4–11)
WBC # BLD AUTO: 4.5 X10(3) UL (ref 4–11)
WBC # BLD AUTO: 5.8 X10(3) UL (ref 4–11)
WBC # BLD AUTO: 7.9 X10(3) UL (ref 4–11)
WBC # BLD AUTO: 9.3 X10(3) UL (ref 4–11)
YERSINIA DNA SPEC NAA+PROBE: NEGATIVE

## 2022-01-01 PROCEDURE — 85027 COMPLETE CBC AUTOMATED: CPT | Performed by: INTERNAL MEDICINE

## 2022-01-01 PROCEDURE — 74270 X-RAY XM COLON 1CNTRST STD: CPT | Performed by: SURGERY

## 2022-01-01 PROCEDURE — 74176 CT ABD & PELVIS W/O CONTRAST: CPT | Performed by: INTERNAL MEDICINE

## 2022-01-01 PROCEDURE — 96375 TX/PRO/DX INJ NEW DRUG ADDON: CPT

## 2022-01-01 PROCEDURE — 82570 ASSAY OF URINE CREATININE: CPT | Performed by: INTERNAL MEDICINE

## 2022-01-01 PROCEDURE — 80076 HEPATIC FUNCTION PANEL: CPT | Performed by: EMERGENCY MEDICINE

## 2022-01-01 PROCEDURE — 96361 HYDRATE IV INFUSION ADD-ON: CPT

## 2022-01-01 PROCEDURE — 83735 ASSAY OF MAGNESIUM: CPT | Performed by: INTERNAL MEDICINE

## 2022-01-01 PROCEDURE — 83735 ASSAY OF MAGNESIUM: CPT | Performed by: HOSPITALIST

## 2022-01-01 PROCEDURE — 76775 US EXAM ABDO BACK WALL LIM: CPT | Performed by: INTERNAL MEDICINE

## 2022-01-01 PROCEDURE — 80048 BASIC METABOLIC PNL TOTAL CA: CPT | Performed by: INTERNAL MEDICINE

## 2022-01-01 PROCEDURE — 84300 ASSAY OF URINE SODIUM: CPT | Performed by: INTERNAL MEDICINE

## 2022-01-01 PROCEDURE — 85025 COMPLETE CBC W/AUTO DIFF WBC: CPT | Performed by: HOSPITALIST

## 2022-01-01 PROCEDURE — 87493 C DIFF AMPLIFIED PROBE: CPT | Performed by: INTERNAL MEDICINE

## 2022-01-01 PROCEDURE — 80048 BASIC METABOLIC PNL TOTAL CA: CPT | Performed by: EMERGENCY MEDICINE

## 2022-01-01 PROCEDURE — 84540 ASSAY OF URINE/UREA-N: CPT | Performed by: INTERNAL MEDICINE

## 2022-01-01 PROCEDURE — 97530 THERAPEUTIC ACTIVITIES: CPT

## 2022-01-01 PROCEDURE — 87186 SC STD MICRODIL/AGAR DIL: CPT | Performed by: INTERNAL MEDICINE

## 2022-01-01 PROCEDURE — 82436 ASSAY OF URINE CHLORIDE: CPT | Performed by: INTERNAL MEDICINE

## 2022-01-01 PROCEDURE — 81001 URINALYSIS AUTO W/SCOPE: CPT | Performed by: INTERNAL MEDICINE

## 2022-01-01 PROCEDURE — 87088 URINE BACTERIA CULTURE: CPT | Performed by: INTERNAL MEDICINE

## 2022-01-01 PROCEDURE — 87086 URINE CULTURE/COLONY COUNT: CPT | Performed by: INTERNAL MEDICINE

## 2022-01-01 PROCEDURE — 93005 ELECTROCARDIOGRAM TRACING: CPT

## 2022-01-01 PROCEDURE — 84133 ASSAY OF URINE POTASSIUM: CPT | Performed by: INTERNAL MEDICINE

## 2022-01-01 PROCEDURE — 97162 PT EVAL MOD COMPLEX 30 MIN: CPT

## 2022-01-01 PROCEDURE — 87507 IADNA-DNA/RNA PROBE TQ 12-25: CPT | Performed by: HOSPITALIST

## 2022-01-01 PROCEDURE — 97166 OT EVAL MOD COMPLEX 45 MIN: CPT

## 2022-01-01 PROCEDURE — 96365 THER/PROPH/DIAG IV INF INIT: CPT

## 2022-01-01 PROCEDURE — 99285 EMERGENCY DEPT VISIT HI MDM: CPT

## 2022-01-01 PROCEDURE — 97535 SELF CARE MNGMENT TRAINING: CPT

## 2022-01-01 PROCEDURE — 80048 BASIC METABOLIC PNL TOTAL CA: CPT | Performed by: HOSPITALIST

## 2022-01-01 PROCEDURE — 93010 ELECTROCARDIOGRAM REPORT: CPT | Performed by: EMERGENCY MEDICINE

## 2022-01-01 PROCEDURE — 84132 ASSAY OF SERUM POTASSIUM: CPT | Performed by: INTERNAL MEDICINE

## 2022-01-01 PROCEDURE — 84132 ASSAY OF SERUM POTASSIUM: CPT | Performed by: STUDENT IN AN ORGANIZED HEALTH CARE EDUCATION/TRAINING PROGRAM

## 2022-01-01 PROCEDURE — 85025 COMPLETE CBC W/AUTO DIFF WBC: CPT | Performed by: EMERGENCY MEDICINE

## 2022-01-01 PROCEDURE — 80053 COMPREHEN METABOLIC PANEL: CPT | Performed by: INTERNAL MEDICINE

## 2022-01-01 PROCEDURE — 99211 OFF/OP EST MAY X REQ PHY/QHP: CPT

## 2022-01-01 PROCEDURE — 74018 RADEX ABDOMEN 1 VIEW: CPT | Performed by: INTERNAL MEDICINE

## 2022-01-01 RX ORDER — LORAZEPAM 2 MG/ML
2 INJECTION INTRAMUSCULAR EVERY 4 HOURS PRN
Status: DISCONTINUED | OUTPATIENT
Start: 2022-01-01 | End: 2022-01-01

## 2022-01-01 RX ORDER — MORPHINE SULFATE 2 MG/ML
1 INJECTION, SOLUTION INTRAMUSCULAR; INTRAVENOUS
Status: DISCONTINUED | OUTPATIENT
Start: 2022-01-01 | End: 2022-01-01

## 2022-01-01 RX ORDER — SODIUM CHLORIDE 9 MG/ML
125 INJECTION, SOLUTION INTRAVENOUS CONTINUOUS
Status: DISCONTINUED | OUTPATIENT
Start: 2022-01-01 | End: 2022-01-01

## 2022-01-01 RX ORDER — DONEPEZIL HYDROCHLORIDE 10 MG/1
10 TABLET, FILM COATED ORAL NIGHTLY
Status: DISCONTINUED | OUTPATIENT
Start: 2022-01-01 | End: 2022-01-01

## 2022-01-01 RX ORDER — METOPROLOL SUCCINATE 50 MG/1
50 TABLET, EXTENDED RELEASE ORAL DAILY
Status: DISCONTINUED | OUTPATIENT
Start: 2022-01-01 | End: 2022-01-01

## 2022-01-01 RX ORDER — MELATONIN
325 EVERY 12 HOURS
Status: DISCONTINUED | OUTPATIENT
Start: 2022-01-01 | End: 2022-01-01

## 2022-01-01 RX ORDER — LORAZEPAM 2 MG/ML
1 INJECTION INTRAMUSCULAR EVERY 4 HOURS PRN
Status: DISCONTINUED | OUTPATIENT
Start: 2022-01-01 | End: 2022-01-01

## 2022-01-01 RX ORDER — ONDANSETRON 4 MG/1
4 TABLET, ORALLY DISINTEGRATING ORAL EVERY 6 HOURS PRN
Status: DISCONTINUED | OUTPATIENT
Start: 2022-01-01 | End: 2022-01-01

## 2022-01-01 RX ORDER — GLYCOPYRROLATE 0.2 MG/ML
0.4 INJECTION, SOLUTION INTRAMUSCULAR; INTRAVENOUS
Status: DISCONTINUED | OUTPATIENT
Start: 2022-01-01 | End: 2022-01-01

## 2022-01-01 RX ORDER — ACETAMINOPHEN 160 MG/5ML
650 SOLUTION ORAL EVERY 6 HOURS SCHEDULED
Status: DISCONTINUED | OUTPATIENT
Start: 2022-01-01 | End: 2022-01-01

## 2022-01-01 RX ORDER — CALCIUM GLUCONATE 10 MG/ML
1 INJECTION, SOLUTION INTRAVENOUS ONCE
Status: COMPLETED | OUTPATIENT
Start: 2022-01-01 | End: 2022-01-01

## 2022-01-01 RX ORDER — HALOPERIDOL 5 MG/ML
1 INJECTION INTRAMUSCULAR
Status: DISCONTINUED | OUTPATIENT
Start: 2022-01-01 | End: 2022-01-01

## 2022-01-01 RX ORDER — MORPHINE SULFATE IN 0.9 % NACL 1 MG/ML
1 PLASTIC BAG, INJECTION (ML) INTRAVENOUS CONTINUOUS PRN
Status: DISCONTINUED | OUTPATIENT
Start: 2022-01-01 | End: 2022-01-01

## 2022-01-01 RX ORDER — METOCLOPRAMIDE HYDROCHLORIDE 5 MG/ML
5 INJECTION INTRAMUSCULAR; INTRAVENOUS EVERY 8 HOURS PRN
Status: DISCONTINUED | OUTPATIENT
Start: 2022-01-01 | End: 2022-01-01

## 2022-01-01 RX ORDER — PANTOPRAZOLE SODIUM 20 MG/1
20 TABLET, DELAYED RELEASE ORAL DAILY
Status: DISCONTINUED | OUTPATIENT
Start: 2022-01-01 | End: 2022-01-01

## 2022-01-01 RX ORDER — POTASSIUM CHLORIDE 14.9 MG/ML
20 INJECTION INTRAVENOUS ONCE
Status: COMPLETED | OUTPATIENT
Start: 2022-01-01 | End: 2022-01-01

## 2022-01-01 RX ORDER — HYDRALAZINE HYDROCHLORIDE 50 MG/1
50 TABLET, FILM COATED ORAL 3 TIMES DAILY
Status: DISCONTINUED | OUTPATIENT
Start: 2022-01-01 | End: 2022-01-01

## 2022-01-01 RX ORDER — SODIUM CHLORIDE 9 MG/ML
INJECTION, SOLUTION INTRAVENOUS CONTINUOUS
Status: DISCONTINUED | OUTPATIENT
Start: 2022-01-01 | End: 2022-01-01

## 2022-01-01 RX ORDER — LORAZEPAM 2 MG/ML
0.5 INJECTION INTRAMUSCULAR EVERY 4 HOURS PRN
Status: DISCONTINUED | OUTPATIENT
Start: 2022-01-01 | End: 2022-01-01

## 2022-01-01 RX ORDER — ONDANSETRON 2 MG/ML
4 INJECTION INTRAMUSCULAR; INTRAVENOUS EVERY 6 HOURS PRN
Status: DISCONTINUED | OUTPATIENT
Start: 2022-01-01 | End: 2022-01-01

## 2022-01-01 RX ORDER — HALOPERIDOL 5 MG/ML
2 INJECTION INTRAMUSCULAR
Status: DISCONTINUED | OUTPATIENT
Start: 2022-01-01 | End: 2022-01-01

## 2022-01-01 RX ORDER — METOCLOPRAMIDE HYDROCHLORIDE 5 MG/ML
5 INJECTION INTRAMUSCULAR; INTRAVENOUS
Status: DISCONTINUED | OUTPATIENT
Start: 2022-01-01 | End: 2022-01-01

## 2022-01-01 RX ORDER — TORSEMIDE 20 MG/1
40 TABLET ORAL DAILY
Status: DISCONTINUED | OUTPATIENT
Start: 2022-01-01 | End: 2022-01-01

## 2022-01-01 RX ORDER — BISACODYL 10 MG
10 SUPPOSITORY, RECTAL RECTAL
Status: DISCONTINUED | OUTPATIENT
Start: 2022-01-01 | End: 2022-01-01

## 2022-01-01 RX ORDER — ACETAMINOPHEN 650 MG/1
650 SUPPOSITORY RECTAL EVERY 6 HOURS PRN
Status: DISCONTINUED | OUTPATIENT
Start: 2022-01-01 | End: 2022-01-01

## 2022-01-01 RX ORDER — METOCLOPRAMIDE HYDROCHLORIDE 5 MG/ML
5 INJECTION INTRAMUSCULAR; INTRAVENOUS EVERY 8 HOURS
Status: COMPLETED | OUTPATIENT
Start: 2022-01-01 | End: 2022-01-01

## 2022-01-01 RX ORDER — SODIUM CHLORIDE 0.9 % (FLUSH) 0.9 %
10 SYRINGE (ML) INJECTION AS NEEDED
Status: DISCONTINUED | OUTPATIENT
Start: 2022-01-01 | End: 2022-01-01

## 2022-01-01 RX ORDER — ACETAMINOPHEN 325 MG/1
650 TABLET ORAL EVERY 6 HOURS PRN
Status: DISCONTINUED | OUTPATIENT
Start: 2022-01-01 | End: 2022-01-01

## 2022-01-01 RX ORDER — ACETAMINOPHEN 650 MG/1
650 SUPPOSITORY RECTAL EVERY 6 HOURS SCHEDULED
Status: DISCONTINUED | OUTPATIENT
Start: 2022-01-01 | End: 2022-01-01

## 2022-01-01 RX ORDER — HEPARIN SODIUM 5000 [USP'U]/ML
5000 INJECTION, SOLUTION INTRAVENOUS; SUBCUTANEOUS EVERY 8 HOURS SCHEDULED
Status: DISCONTINUED | OUTPATIENT
Start: 2022-01-01 | End: 2022-01-01

## 2022-01-01 RX ORDER — SODIUM CHLORIDE, SODIUM LACTATE, POTASSIUM CHLORIDE, CALCIUM CHLORIDE 600; 310; 30; 20 MG/100ML; MG/100ML; MG/100ML; MG/100ML
INJECTION, SOLUTION INTRAVENOUS CONTINUOUS
Status: DISCONTINUED | OUTPATIENT
Start: 2022-01-01 | End: 2022-01-01

## 2022-01-01 RX ORDER — ATORVASTATIN CALCIUM 40 MG/1
40 TABLET, FILM COATED ORAL NIGHTLY
Status: DISCONTINUED | OUTPATIENT
Start: 2022-01-01 | End: 2022-01-01

## 2022-01-01 RX ORDER — ACETAMINOPHEN 160 MG/5ML
650 SOLUTION ORAL EVERY 6 HOURS PRN
Status: DISCONTINUED | OUTPATIENT
Start: 2022-01-01 | End: 2022-01-01

## 2022-01-01 RX ORDER — SENNA AND DOCUSATE SODIUM 50; 8.6 MG/1; MG/1
2 TABLET, FILM COATED ORAL DAILY
Status: DISCONTINUED | OUTPATIENT
Start: 2022-01-01 | End: 2022-01-01

## 2022-01-01 RX ORDER — CHOLECALCIFEROL (VITAMIN D3) 125 MCG
1000 CAPSULE ORAL
Status: DISCONTINUED | OUTPATIENT
Start: 2022-01-01 | End: 2022-01-01

## 2022-01-01 RX ORDER — DOCUSATE SODIUM 100 MG/1
100 CAPSULE, LIQUID FILLED ORAL 2 TIMES DAILY
Status: DISCONTINUED | OUTPATIENT
Start: 2022-01-01 | End: 2022-01-01

## 2022-01-01 RX ORDER — LEVOTHYROXINE SODIUM 0.07 MG/1
75 TABLET ORAL
Status: DISCONTINUED | OUTPATIENT
Start: 2022-01-01 | End: 2022-01-01

## 2022-03-11 PROCEDURE — 87798 DETECT AGENT NOS DNA AMP: CPT | Performed by: CLINICAL MEDICAL LABORATORY

## 2022-03-11 PROCEDURE — PSEU9950 NOROVIRUS GI / GII: Performed by: CLINICAL MEDICAL LABORATORY

## 2022-03-11 PROCEDURE — PSEU9022 OVA AND PARASITE EXAM: Performed by: CLINICAL MEDICAL LABORATORY

## 2022-03-11 PROCEDURE — 87209 SMEAR COMPLEX STAIN: CPT | Performed by: CLINICAL MEDICAL LABORATORY

## 2022-03-11 PROCEDURE — 87177 OVA AND PARASITES SMEARS: CPT | Performed by: CLINICAL MEDICAL LABORATORY

## 2022-03-12 ENCOUNTER — LAB REQUISITION (OUTPATIENT)
Dept: LAB | Age: 87
End: 2022-03-12

## 2022-03-12 DIAGNOSIS — Z13.9 ENCOUNTER FOR SCREENING, UNSPECIFIED: ICD-10-CM

## 2022-03-13 LAB
NOROVIRUS GII RNA STL QL NAA+PROBE: NOT DETECTED
NOROVIRUS GII RNA STL QL NAA+PROBE: NOT DETECTED
SERVICE CMNT-IMP: NORMAL

## 2022-03-15 LAB — O+P SPEC MICRO: NORMAL

## 2022-03-20 PROCEDURE — PSEU9022 OVA AND PARASITE EXAM: Performed by: CLINICAL MEDICAL LABORATORY

## 2022-03-20 PROCEDURE — 87209 SMEAR COMPLEX STAIN: CPT | Performed by: CLINICAL MEDICAL LABORATORY

## 2022-03-20 PROCEDURE — 87177 OVA AND PARASITES SMEARS: CPT | Performed by: CLINICAL MEDICAL LABORATORY

## 2022-03-21 ENCOUNTER — LAB REQUISITION (OUTPATIENT)
Dept: LAB | Age: 87
End: 2022-03-21

## 2022-03-21 DIAGNOSIS — Z13.9 ENCOUNTER FOR SCREENING, UNSPECIFIED: ICD-10-CM

## 2022-03-21 PROBLEM — E87.6 HYPOKALEMIA: Status: ACTIVE | Noted: 2022-01-01

## 2022-03-21 PROBLEM — E87.2 METABOLIC ACIDOSIS: Status: ACTIVE | Noted: 2022-01-01

## 2022-03-21 PROBLEM — E83.51 HYPOCALCEMIA: Status: ACTIVE | Noted: 2022-01-01

## 2022-03-21 PROBLEM — E86.0 DEHYDRATION: Status: ACTIVE | Noted: 2022-01-01

## 2022-03-21 NOTE — CM/SW NOTE
03/21/22 1500   CM/SW Referral Data   Referral Source    Reason for Referral Discharge planning   Informant EMR;Clinical Staff Member   Pertinent Medical Hx   Does patient have an established PCP? Yes  (Yulia Diallo)   Patient Info   Patient's Current Mental Status at Time of Assessment Confused or unable to complete assessment   Patient's [address] Acute Care Provider Upon Admission   ([address])   Patient Status Prior to Admission   Independent with ADLs and Mobility No   Pt. requires assistance with Housework;Driving;Meals; Bathing;Dressing;Medications; Feeding; Toileting;Finances   Discharge Needs   Anticipated D/C needs Long term care facility;Assisted/Supported living   Choice of Post-Acute Provider   Patient/family choice   ([address])     Pt discussed during nursing rounds. Dx hypocalcemia, new ilieus found after KUB. From [address] where patient will return when medically stable. Plan: [address] pending medical clearance. / to remain available for support and/or discharge planning.      CHANDANA WuN    [phone]

## 2022-03-21 NOTE — WOUND PROGRESS NOTE
RN consulted wound care for \"ble red and fragile skin\". Pt has bilateral lower edema, intact pink skin. Pt will not tolerate compression. Spoke to bedside RN. Wound care signing off.

## 2022-03-21 NOTE — PHYSICAL THERAPY NOTE
PT am note: Pt worked with OT in am and then was away from room for xray. Pt will continue to follow. Plan is to return to LTC as medical progress allows.

## 2022-03-22 NOTE — CM/SW NOTE
BPCI-Advanced Medicare Program Note:  Plan of care reviewed for care coordination and discharge planning. Noted patient falls under  BPCI/Medicare program, with  for renal failure. GUIDO tool was used to help determine next care setting. Thus, [address] recommendations is for [facility] ([facility]) pending patient progress. Case Management team is following for care coordination and discharge planning needs.

## 2022-03-23 LAB — O+P SPEC MICRO: NORMAL

## 2022-03-23 NOTE — CM/SW NOTE
CM contacted [address] admissions to give status update on patient. Pt currently requiring MAX assist for transfers and ADLs which is baseline per admissions representative. Pt can return to memory care apartment when medically stable. Plan: [address] pending medical clearance.     PAGE Logan    [phone]

## 2022-03-24 NOTE — OCCUPATIONAL THERAPY NOTE
Pt not seen for OT at this time secondary to declining session despite encouragement. Attempted to sit at EOB or transfer to chair, pt declining all.  Will follow up with pt next date as schedule permits

## 2022-03-25 NOTE — PLAN OF CARE
Held hydralazine today, as parameters not met. Replaced rectal tube today. NPO effective now per general surgery for abdominal distention. Small episode of emesis and nausea present today. Frequent turning and frequent rounding. Side rails up. Bed alarm on. Problem: CARDIOVASCULAR - ADULT  Goal: Maintains optimal cardiac output and hemodynamic stability  Description: INTERVENTIONS:  - Monitor vital signs, rhythm, and trends  - Monitor for bleeding, hypotension and signs of decreased cardiac output  - Evaluate effectiveness of vasoactive medications to optimize hemodynamic stability  - Monitor arterial and/or venous puncture sites for bleeding and/or hematoma  - Assess quality of pulses, skin color and temperature  - Assess for signs of decreased coronary artery perfusion - ex.  Angina  - Evaluate fluid balance, assess for edema, trend weights  Outcome: Progressing  Goal: Absence of cardiac arrhythmias or at baseline  Description: INTERVENTIONS:  - Continuous cardiac monitoring, monitor vital signs, obtain 12 lead EKG if indicated  - Evaluate effectiveness of antiarrhythmic and heart rate control medications as ordered  - Initiate emergency measures for life threatening arrhythmias  - Monitor electrolytes and administer replacement therapy as ordered  Outcome: Progressing     Problem: SKIN/TISSUE INTEGRITY - ADULT  Goal: Skin integrity remains intact  Description: INTERVENTIONS  - Assess and document risk factors for pressure ulcer development  - Assess and document skin integrity  - Monitor for areas of redness and/or skin breakdown  - Initiate interventions, skin care algorithm/standards of care as needed  Outcome: Progressing  Goal: Incision(s), wounds(s) or drain site(s) healing without S/S of infection  Description: INTERVENTIONS:  - Assess and document risk factors for pressure ulcer development  - Assess and document skin integrity  - Assess and document dressing/incision, wound bed, drain sites and surrounding tissue  - Implement wound care per orders  - Initiate isolation precautions as appropriate  - Initiate Pressure Ulcer prevention bundle as indicated  Outcome: Progressing

## 2022-03-26 NOTE — CM/SW NOTE
ALBERTO received for hospice. [facility] notified, referral sent in [facility].  liaison confirmed she will contact [facility].     PAGE Powell    [phone]

## 2022-03-26 NOTE — HOSPICE RN NOTE
Meeting with Janette Reinoso, pt's POA and [facility], pt's scott Sunday, March 27 at 1pm.  Kendall Blackburn CM via MITESH.

## 2022-03-27 PROBLEM — C18.7 CANCER OF SIGMOID COLON (HCC): Status: ACTIVE | Noted: 2022-01-01

## 2022-03-27 NOTE — HOSPICE RN NOTE
[facility] Evening Rounds  Patient is lying in bed. Responds to verbal and tactile stimuli. Denies pain when lying still. Morphine continuous infusion in place at 1mg/hr. Patient continues to have labored breathing. Accessory muscles in use and RR 24. Reviewed POC with Chevy Sigala, [facility] RN. Recommend increasing morphine to 2mg/hr if patient continues to have labored breathing. Eduard Duckworth, to give update and new room number. Alfredito agreeable with POC and verbalized that the most important thing is to keep the patient comfortable. Will continue to monitor for comfort and dignity.

## 2022-03-28 NOTE — PROGRESS NOTES
03/28/22 1433   Clinical Encounter Type   Visited With Patient and family together   Routine Visit Introduction   Crisis Visit Patient actively dying   Referral From Nurse   Referral To    [facility]ats[facility] Encounters   Spiritual Requests During Visit / Hospitalization Sacrament of the Sick   Family Spiritual Encounters   Family Coping Accepting   Taxonomy   Intended Effects Build relationship of care and support   Methods Offer emotional support; Offer spiritual/Christian support   Interventions Acknowledge current situation; Active listening; Ask guided questions;Provde spiritual/Christian resources     [facility] visited with pt and spoke with pts caregiver via phone. Introduced self, offering emotional/spiritual care. Pts caregiver requested for pt to have last rites. Pt experiencing terminal illness. Pt seems comfortable. Provided prayer at bedside and coordination of  visit. Chaplains will remain available for support. Rev. Celio Hinojosa, Pastoral Care Dept.   [id number], 24/7

## 2022-03-28 NOTE — HOSPICE RN NOTE
GIP Day 2. [facility] TNL assessment. Patient lying in bed, not responsive to verbal or tactile stimuli. Family not at bedside during [address]. MS gtt at 2mg/hr. IV Patent. Pt received no PRNs from previous TNL assessment. On RA. PPS 10%    Upon assessment, RR 20 and labored, accessory muscle use / primarily \"belly breathing. \" Audibly snoring with exhilation. Upon auscultation, extremely diminished bilaterally. Pt afebrile this morning, went down with use of ice. Hypotensive and tachycardic, weak upon palpation. Skin pale, legs with erythema BL (similar in appearance to cellulitis), warm to touch. BLE edema 1+ pitting, BUE nonpitting. Incontinent, pure wick patent. Incontinent skin breakdown being care for by nursing staff, as well as R heel wound. Recommend: 1mg MS IVP PRN now for labored breathing, assess then for need to increase MS gtt if needed    Discussed POC and recommendations with Kamron Woodard, RN - RN verbalized understanding, aware of how to contact team if needed    Called and spoke with Da Jackson, regarding POC. Agreeable, all questions encouraged and answered, aware of how to contact [facility] team if needed. States they are working on Pro Breath MD today, likely will not return until this evening. Pt remains appropriate for inpatient level of hospice at this time for continued RN assessments, as well as for frequent administration and titration of IV medications for management of respiratory distress. [facility] to continue to monitor pt, offer services, and provide support to patient and family as needed.

## 2022-03-28 NOTE — HOSPICE RN NOTE
[facility] TNL Rounds. Patient lying in bed not responsive to verbal or tactile stimuli. Family not at bedside during assessment. MS gtt continues at 2mg/hr. IV Patent. Pt received 1 mg MS IVP PRN since last assessment for dyspnea. On RA. PPS 10%    Pt appears pale. Upon assessment, RR 16 and labored, pt gasping for breaths and using accessory muscles. Some wheezing in R lobes upon inspiration and expiration, L lobes extremely diminished. No other changes from previous assessment. Pure wick in tact    Recommend: increase MS gtt 3mg/hr for better respiratory distress control, as patient     Discussed POC and recommendations with Carolee Hill, RN - RN verbalized understanding, aware of how to contact team if needed. Pt remains appropriate for inpatient level of hospice at this time for continued RN assessments, as well as for frequent administration and titration of IV medications for management of respiratory distress. [facility] to continue to monitor pt, offer services, and provide support to patient and family as needed.

## 2022-03-29 NOTE — PROGRESS NOTES
Pt  at [facility]. Resuscitation not attempted as pt was DNAR/Comfort. Time of Death: 11:24 pm    Family notified: Kiya Carcamo - niece's     MD: Dr. Charleen Plascencia (on call hospitalist) notified    [facility] [place] notified: [facility] [employer]; ref # [id number]    Belongings sent down with patient.

## 2022-03-29 NOTE — SPIRITUAL CARE NOTE
RN notified  that pt made final transition.  prayed at pt's bedside.  completed final transition paperwork by phone with POA.

## 2023-04-24 NOTE — IP AVS SNAPSHOT
Patient Demographics     Address  [address] [place] [address]  Melvin Persaud [address] Phone  [phone] [facility])  [phone] [facility]      Emergency Contact(s)     Name Relation Home Work [place], [place] OTHER   [phone]      Allergies as of 10/17/ Received electronic message from pharmacy requesting a refill of Norco.    Last refill: 4/10/23  Last office visit: 3/9/23  Scheduled for follow up 7/13/23     Will route request to MD for review.     Reviewed [place]  Report.     with no known cause. If you or someone with you has one or more of these signs, don't delay! Immediately call 9-1-1, or the emergency medical services (EMS) number so an ambulance (ideally with advanced life support) can be sent for you.   Also, check the Place 1 patch onto the skin daily. Yared Zamora MD         metoprolol succinate 50 MG Tb24  Commonly known as: Toprol XL  Next dose due:  Tomorrow morning      TAKE 1 TABLET BY MOUTH DAILY - HOLD IF SBP < 100 OR [place] < 60   MD karrie Trinidad (PF) (FLUZONE HD) high dose for 65 yrs & older inj 0.7ml 10/17/21 1338 Given              Recent Vital Signs       Most Recent Value   Vitals 151/59 Filed at 10/17/2021 0924   Pulse 80 Filed at 10/17/2021 0603   Resp 18 Filed at 10/17/2021 0924   Temp 98. 9 RBC 3.17 3.80 - 5.30 x10(6)uL L [facility] ([facility])   HGB 8.8 12.0 - 16.0 g/dL L [facility])   HCT 28.3 35.0 - 48.0 % L [facility])   MCV 89.3 80.0 - 100.0 fL — [facility])   MCH 27.8 26.0 - 34.0 pg — [facility] ([facility])   MCHC 31.1 31 Iron Lawrence MD    Date of Admission: 10/14/2021  8:47 AM    ASSESSMENT / PLAN:     Ms. Killian Moon is a 81 yo F with PMH of HTN who presented after a syncopal episode at SNF.      Acute hypoxic respiratory failure  - found to be hypoxic in ED, 85% on RA occipital lobe infarct, age indeterminate. Also noted to have hypoxia, 85% on RA. Denies shortness of breath or chest pain. No history of CHF, COPD, asthma. Niece Namita Edwards and her  Brandon Torres are her only family members, Brandon Torres is POA.  States she has had some l what's stated above.      OBJECTIVE:  /54 (BP Location: Right arm)   Pulse 55   Temp 97.4 °F (36.3 °C) (Oral)   Resp 15   Wt 175 lb 0.7 oz (79.4 kg)   SpO2 99%   BMI 32.02 kg/m²     GEN: elderly female in NAD x 2  HEENT: EOMI, no facial droop  Pulm: C overload. 2. Otherwise no acute pulmonary process. 3. Moderate hiatal hernia is unchanged.     Dictated by (CST): Paulino Tadeo MD on 10/14/2021 at 10:03 AM     Finalized by (CST): Paulino Tadeo MD on 10/14/2021 at 10:05 AM                Electron recommendations pending patient's clinical course.   [facility] hospitalist to continue to follow patient while in house    Patient and/or patient's family given opportunity to ask questions and note understanding and agreeing with therapeutic plan as outlined DAILY, Disp: 90 tablet, Rfl: 1  LEVOTHYROXINE SODIUM 75 MCG Oral Tab, TAKE 1 TABLET BY MOUTH EVERY MORNING, Disp: 90 tablet, Rfl: 1  METOPROLOL SUCCINATE ER 50 MG Oral Tablet 24 Hr, TAKE 1 TABLET BY MOUTH DAILY - HOLD IF SBP < 100 OR [place] < 60, Disp: 90 tabl TROP <0.045 10/14/2021       Recent Labs   Lab 10/14/21  0850   TROP <0.045       Additional Diagnostics: ECG:     Radiology: CT BRAIN OR HEAD (69419)    Result Date: 10/14/2021  CONCLUSION:  Moderate chronic microvascular ischemic disease.   Right occipita Consult:  10/14/2021    HPI:     Anthony Ritchie is a 80year old woman with past medical history of hypertension who presented after syncopal episode at her SNF. Found to be hypoxic in the emergency room, saturating 85% with possible fluid overload.   She reviewed. No pertinent surgical history.     SOCIAL HISTORY  Social History    Socioeconomic History      Marital status: Single    Tobacco Use      Smoking status: Never Smoker      Smokeless tobacco: Never Used    Vaping Use      Vaping Use: Never used TROP <0.045 10/14/2021       HGBA1C:  No results found for: A1C, EAG             IMAGING:  CT brain  I PERSONALLY REVIEWED THESE IMAGES    ASSESSMENT:  The patient is a 80year old woman with past medical history of hypertension, impairment, learning disab still exist    Suzanne Bright DO   Staff Neurologist   10/14/2021  3:26 PM                  Electronically signed by Alberto Vitale DO on 10/14/2021  3:40 PM              Discharge Summary - D/C Summary      Discharge Summary signed by Tayler Lorenzo MD at 10/17/ [facility] and her  Walker Stoo are her only family members, Walker Plants is POA. States she has had some leg swelling for several years, has been on diuretics.      Hospital Course:   Ms. Madhav Ruvalcaba is a 79 yo F with PMH of HTN who presented after a syncopal episode negative, antibiotics stopped     Lower ext DVT  - has had swelling for several years per family  - Left lower extremity with distal left femoral vein DVT  - Heparin drip -> transitioned to Eliquis   -We will need 3 months of therapy, changed to Plavix aft 6:07 PM          US VENOUS DOPPLER LEG BILAT - DIAG IMG (CPT=93970)    Result Date: 10/14/2021  CONCLUSION:  Nonocclusive DVT in the distal left femoral vein.    Dictated by (CST): Thais Miguel MD on 10/14/2021 at 5:57 PM     Finalized by (CST): [id number]    Phone: [phone]   · atorvastatin 40 MG Tabs     You can get these medications from any pharmacy    Bring a paper prescription for each of these medications  · apixaban 5 MG Tabs         FU   Follow-up Information     Andi Barajas,  I eye or both eyes,  Sudden trouble walking, dizziness, loss of balance or coordination,  Sudden severe headache with no known cause. If you or someone with you has one or more of these signs, don't delay!   Immediately call 9-1-1, or the emergency medical s > 5 ft, which are below the patient's pre-admission status. Chart reviewed. RN aware. PT wore gloves and mask for PPE. Patient up in bed. RN present and assisted with session. Max x2 supine to sit with poor trunk control.   Min A with sitting at [facility] living facility   Home Layout: One level  Stairs to Enter : 0     Stairs to Bedroom: 0       Lives With: Staff 24 hours  Drives: No  Patient Owned Equipment: [facility]       Prior Level of [place]: Lives at [facility]- uses [address] [place] [address] in apt, WC outside 33.86   CMS Modifier (G-Code): CL    FUNCTIONAL ABILITY STATUS  Gait Assessment   Gait Assistance: Maximum assistance  Distance (ft): 5  Assistive Device: Rolling walker  Pattern: Shuffle  Stoop/Curb Assistance: Not tested       Bed Mobility: max x2    Tra able/appropriate.     Rosamaria Almendarez [place]  #[id number]                Occupational Therapy Notes (last 72 hours)      Occupational Therapy Note signed by Barbara Jean OT at 10/15/2021  5:45 PM  Version 1 of 1    Author: Barbara Jean OT Service: Haleigh Gonzalez

## (undated) NOTE — IP AVS SNAPSHOT
[facility]            (For Outpatient Use Only) Initial Admit Date: 10/14/2021   Inpt/Obs Admit Date: Inpt: 10/14/21 / Obs: N/A   Discharge Date:    Christie Proper:  [de-identified]   MRN: [de-identified]   CSN: [id number]   CEID: [id number]        E Plan:    Group Number:  Insurance Type:    Subscriber Name:  Subscriber :    Subscriber ID:  Pt Rel to Subscriber:    Hospital Account Financial Class: Medicare    2021